# Patient Record
Sex: MALE | Race: WHITE | NOT HISPANIC OR LATINO | URBAN - METROPOLITAN AREA
[De-identification: names, ages, dates, MRNs, and addresses within clinical notes are randomized per-mention and may not be internally consistent; named-entity substitution may affect disease eponyms.]

---

## 2024-05-30 DIAGNOSIS — I10 HYPERTENSION, UNSPECIFIED TYPE: ICD-10-CM

## 2024-05-30 RX ORDER — LOSARTAN POTASSIUM 100 MG/1
1 TABLET ORAL DAILY
COMMUNITY
Start: 2020-10-30 | End: 2024-05-30 | Stop reason: SDUPTHER

## 2024-05-31 RX ORDER — LOSARTAN POTASSIUM 100 MG/1
100 TABLET ORAL DAILY
Qty: 30 TABLET | Refills: 3 | Status: SHIPPED | OUTPATIENT
Start: 2024-05-31

## 2024-07-09 PROBLEM — I25.10 CAD (CORONARY ARTERY DISEASE): Status: ACTIVE | Noted: 2024-07-09

## 2024-07-09 PROBLEM — I10 BENIGN ESSENTIAL HTN: Status: ACTIVE | Noted: 2024-07-09

## 2024-07-09 PROBLEM — E78.00 HYPERCHOLESTEROLEMIA: Status: ACTIVE | Noted: 2024-07-09

## 2024-07-25 PROBLEM — I10 BENIGN ESSENTIAL HYPERTENSION: Chronic | Status: ACTIVE | Noted: 2024-07-09

## 2024-07-26 ENCOUNTER — OFFICE VISIT (OUTPATIENT)
Dept: CARDIOLOGY | Facility: CLINIC | Age: 79
End: 2024-07-26
Payer: MEDICARE

## 2024-07-26 VITALS
SYSTOLIC BLOOD PRESSURE: 159 MMHG | BODY MASS INDEX: 23.98 KG/M2 | WEIGHT: 177 LBS | OXYGEN SATURATION: 98 % | HEART RATE: 76 BPM | DIASTOLIC BLOOD PRESSURE: 101 MMHG | HEIGHT: 72 IN

## 2024-07-26 DIAGNOSIS — I25.10 CORONARY ARTERY DISEASE INVOLVING NATIVE CORONARY ARTERY OF NATIVE HEART WITHOUT ANGINA PECTORIS: Primary | ICD-10-CM

## 2024-07-26 PROCEDURE — 93005 ELECTROCARDIOGRAM TRACING: CPT | Performed by: INTERNAL MEDICINE

## 2024-07-26 PROCEDURE — 1036F TOBACCO NON-USER: CPT | Performed by: INTERNAL MEDICINE

## 2024-07-26 PROCEDURE — 3080F DIAST BP >= 90 MM HG: CPT | Performed by: INTERNAL MEDICINE

## 2024-07-26 PROCEDURE — 3077F SYST BP >= 140 MM HG: CPT | Performed by: INTERNAL MEDICINE

## 2024-07-26 PROCEDURE — 1160F RVW MEDS BY RX/DR IN RCRD: CPT | Performed by: INTERNAL MEDICINE

## 2024-07-26 PROCEDURE — 99204 OFFICE O/P NEW MOD 45 MIN: CPT | Performed by: INTERNAL MEDICINE

## 2024-07-26 PROCEDURE — 1159F MED LIST DOCD IN RCRD: CPT | Performed by: INTERNAL MEDICINE

## 2024-07-26 PROCEDURE — 99214 OFFICE O/P EST MOD 30 MIN: CPT | Performed by: INTERNAL MEDICINE

## 2024-07-26 RX ORDER — ASPIRIN 81 MG/1
81 TABLET ORAL DAILY
Qty: 30 TABLET | Refills: 11 | Status: SHIPPED | OUTPATIENT
Start: 2024-07-26 | End: 2025-07-26

## 2024-07-26 RX ORDER — ATORVASTATIN CALCIUM 80 MG/1
80 TABLET, FILM COATED ORAL DAILY
Qty: 90 TABLET | Refills: 3 | Status: SHIPPED | OUTPATIENT
Start: 2024-07-26 | End: 2025-07-26

## 2024-07-26 NOTE — PROGRESS NOTES
Referred by No ref. provider found    HPI I am seeing Axel for the first time in 4 years.  At that time, it was a placed on admit him.  He was transferring from [place].  He had a heart attack 2013 with 3 stents being placed in his LAD in [place].  Subsequently he moved to [place].  He was cared for by Dr. WILFREDO Reyes.  4 years ago I requested stress testing and lab work.  This never happened.  He actually has not had any lab work and has stopped taking his atorvastatin carvedilol and aspirin.  He is physically active.  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  No chest pain or pressure.  No shortness of breath.    Past Medical History:  Problem List Items Addressed This Visit    None     Past Medical History:   Diagnosis Date    Benign essential hypertension 07/09/2024         Past Surgical History:  He has no past surgical history on file.      Social History:  He reports that he has never smoked. He has never used smokeless tobacco. No history on file for alcohol use and drug use.    Family History:  No family history on file.     Allergies:  Patient has no known allergies.    Outpatient Medications:  Current Outpatient Medications   Medication Instructions    losartan (COZAAR) 100 mg, oral, Daily     Last Recorded Vitals:  Vitals:    07/26/24 1311   BP: (!) 159/101   BP Location: Left arm   Patient Position: Sitting   Pulse: 76   SpO2: 98%   Weight: 80.3 kg (177 lb)   Height: 1.829 m (6')     Physical Exam  Patient is alert and oriented x3.  HEENT is unremarkable mucous members are moist  Neck no JVP no bruits upstrokes are full no thyromegaly  Lungs are clear bilaterally.  No wheezing crackles or rales  Heart regular rhythm normal S1-S2 there is no S3 no murmurs are heard.  Abdomen is soft bs are positive nontender nondistended no organomegaly no pulsatile masses  Extremities have no edema.  Distal pulses present palpable.  Neuro is grossly nonfocal  Skin has no rashes     Last Labs:  CBC -  No results found for:  "\"WBC\", \"HGB\", \"HCT\", \"MCV\", \"PLT\"    CMP -  No results found for: \"CALCIUM\", \"PHOS\", \"PROT\", \"ALBUMIN\", \"AST\", \"ALT\", \"ALKPHOS\", \"BILITOT\"    LIPID PANEL -   No results found for: \"CHOL\", \"HDL\", \"CHHDL\", \"LDL\", \"VLDL\", \"TRIG\", \"NHDL\"    RENAL FUNCTION PANEL -   No results found for: \"GLU\", \"K\", \"CHLOR\", \"PHOS\"    Lab Results   Component Value Date    HGBA1C 7.7 (H) 04/01/2022          Assessment/Plan   1. CAD. MI 2013 [place]. Status post stenting x3. I am not sure which vessels were stented. Will obtain records from Dr. CHARLES Smith in [place].  Overall doing well.  EKG today.  I have asked for him to restart his baby aspirin.  I have asked for him to restart his atorvastatin with a target LDL of less than 70.  I have asked for him to have an exercise stress echo here in the [facility] location.      2. Hyperlipidemia.  He has been off of atorvastatin for some time.  He will restart 80 mg of atorvastatin.  Fasting blood work will be obtained in approximately 3 months.     3. Hypertension. Very high here, but at home typically in the 120's.  He will monitor this.  He was on carvedilol before but stopped it because it made him feel sluggish.     EKG today.  Restart aspirin, and atorvastatin.  Stress echo.  Return to see me 4 months.  Sooner if any issues arise    Dannie Yun MD     Instructions and follow up    "

## 2024-07-26 NOTE — PATIENT INSTRUCTIONS
1. CAD. MI 2013 [place]. Status post stenting x3. I am not sure which vessels were stented. Will obtain records from Dr. CHARLES Smith in [place].  Overall doing well.  EKG today.  I have asked for him to restart his baby aspirin.  I have asked for him to restart his atorvastatin with a target LDL of less than 70.  I have asked for him to have an exercise stress echo here in the [facility].      2. Hyperlipidemia.  He has been off of atorvastatin for some time.  He will restart 80 mg of atorvastatin.  Fasting blood work will be obtained in approximately 3 months.     3. Hypertension. Very high here, but at home typically in the 120's.  He will monitor this.  He was on carvedilol before but stopped it because it made him feel sluggish.     EKG today.  Restart aspirin, and atorvastatin.  Stress echo.  Return to see me 4 months.  Sooner if any issues arise

## 2024-07-27 LAB
ATRIAL RATE: 70 BPM
P AXIS: 56 DEGREES
P OFFSET: 189 MS
P ONSET: 133 MS
PR INTERVAL: 170 MS
Q ONSET: 218 MS
QRS COUNT: 12 BEATS
QRS DURATION: 80 MS
QT INTERVAL: 398 MS
QTC CALCULATION(BAZETT): 429 MS
QTC FREDERICIA: 419 MS
R AXIS: 61 DEGREES
T AXIS: -47 DEGREES
T OFFSET: 417 MS
VENTRICULAR RATE: 70 BPM

## 2024-09-03 ENCOUNTER — APPOINTMENT (OUTPATIENT)
Dept: CARDIOLOGY | Facility: CLINIC | Age: 79
End: 2024-09-03
Payer: MEDICARE

## 2024-09-11 DIAGNOSIS — I25.10 CORONARY ARTERY DISEASE INVOLVING NATIVE CORONARY ARTERY OF NATIVE HEART WITHOUT ANGINA PECTORIS: ICD-10-CM

## 2024-09-11 DIAGNOSIS — I10 HYPERTENSION, UNSPECIFIED TYPE: ICD-10-CM

## 2024-09-11 RX ORDER — ATORVASTATIN CALCIUM 80 MG/1
80 TABLET, FILM COATED ORAL DAILY
Qty: 90 TABLET | Refills: 3 | Status: SHIPPED | OUTPATIENT
Start: 2024-09-11 | End: 2024-09-13 | Stop reason: SDUPTHER

## 2024-09-11 RX ORDER — LOSARTAN POTASSIUM 100 MG/1
100 TABLET ORAL DAILY
Qty: 90 TABLET | Refills: 3 | Status: SHIPPED | OUTPATIENT
Start: 2024-09-11

## 2024-09-13 DIAGNOSIS — I25.10 CORONARY ARTERY DISEASE INVOLVING NATIVE CORONARY ARTERY OF NATIVE HEART WITHOUT ANGINA PECTORIS: Primary | ICD-10-CM

## 2024-09-13 RX ORDER — ATORVASTATIN CALCIUM 80 MG/1
80 TABLET, FILM COATED ORAL DAILY
Qty: 90 TABLET | Refills: 3 | Status: SHIPPED | OUTPATIENT
Start: 2024-09-13 | End: 2025-09-13

## 2024-10-10 ENCOUNTER — TELEPHONE (OUTPATIENT)
Dept: CARDIOLOGY | Facility: CLINIC | Age: 79
End: 2024-10-10
Payer: MEDICARE

## 2024-10-10 NOTE — TELEPHONE ENCOUNTER
Pharmacist is asking if patient should continue on Clopidogrel? Patient is asking them for refills. Stents placed in 2013.    Last OV 7/26/24  1. CAD. MI 2013 [place]. Status post stenting x3. I am not sure which vessels were stented. Will obtain records from Dr. CHARLES Smith in [place].  Overall doing well.  EKG today.  I have asked for him to restart his baby aspirin.  I have asked for him to restart his atorvastatin with a target LDL of less than 70.  I have asked for him to have an exercise stress echo here in the [facility] location.

## 2024-10-17 ENCOUNTER — APPOINTMENT (OUTPATIENT)
Dept: CARDIOLOGY | Facility: CLINIC | Age: 79
End: 2024-10-17
Payer: MEDICARE